# Patient Record
Sex: FEMALE | Race: WHITE | NOT HISPANIC OR LATINO | URBAN - METROPOLITAN AREA
[De-identification: names, ages, dates, MRNs, and addresses within clinical notes are randomized per-mention and may not be internally consistent; named-entity substitution may affect disease eponyms.]

---

## 2019-11-26 ENCOUNTER — NURSING HOME VISIT (OUTPATIENT)
Dept: GERIATRICS | Facility: OTHER | Age: 84
End: 2019-11-26
Payer: MEDICARE

## 2019-11-26 DIAGNOSIS — I49.5 SSS (SICK SINUS SYNDROME) (HCC): Primary | ICD-10-CM

## 2019-11-26 DIAGNOSIS — N18.30 CKD (CHRONIC KIDNEY DISEASE) STAGE 3, GFR 30-59 ML/MIN (HCC): ICD-10-CM

## 2019-11-26 DIAGNOSIS — H91.93 BILATERAL HEARING LOSS, UNSPECIFIED HEARING LOSS TYPE: ICD-10-CM

## 2019-11-26 DIAGNOSIS — I10 ESSENTIAL HYPERTENSION: ICD-10-CM

## 2019-11-26 DIAGNOSIS — D64.9 ANEMIA, UNSPECIFIED TYPE: ICD-10-CM

## 2019-11-26 DIAGNOSIS — Z95.0 S/P PLACEMENT OF CARDIAC PACEMAKER: ICD-10-CM

## 2019-11-26 PROCEDURE — 99310 SBSQ NF CARE HIGH MDM 45: CPT | Performed by: FAMILY MEDICINE

## 2019-11-26 NOTE — PROGRESS NOTES
[facility]   [address] [place] [address], [place], [address]  Progress Note  POS: SNF- 31    Unable to obtain from patient due to: N/A; history obtained from patient along with record review; additional history obtained from daughter, Juan Carlos Tripathi, via telephone  Old medical records reviewed; paper medical records reviewed from [facility]  Chief Complaint/Reason for visit: Transfer of physician service; SSS s/p pacemaker, follow up ELVIS  History of Present Illness: 80year old female seen for transfer of care to my medical service; also seen for follow up of ELVIS, hypertension, SSS s/p pacemaker placement on 11/19/19  Spoke with daughter, Juan Carlos Tripathi ([phone]) via telelphone at 80; she provided additional history; reviewed patient's condition and current plan of care; all questions answered  Patient denies pain at area of pacer site and in general; compliant with LUE restriction in this setting; patient notes some difficulty with ADLs related to this; she is right handed  No chest pain, SOB, LE edema, palpitations, fever or chills  Recent history of ELVIS secondary to fall and rhabdomyolysis  Has CKD3 with baseline creatinine of 1 4  Additional PMH of hypertension on 3 agents (see med list below); SBP trending 110s-160s  Patient is hard of hearing  She is feeling better overall and is anxious to return home  Past Medical History: reviewed and updated   Past Medical History:   Diagnosis Date    Arthritis     CKD (chronic kidney disease) stage 3, GFR 30-59 ml/min ([facility])     GI bleed     Hypertension     Urinary incontinence      Family History: reviewed and updated- noncontributory to HPI  Social History: reviewed and updated  Resident Since: STR  Review of systems: Review of Systems   Constitutional: Negative for activity change, appetite change, fever and unexpected weight change  HENT: Positive for hearing loss  Negative for trouble swallowing  Eyes: Negative for visual disturbance     Respiratory: Negative for cough, chest tightness and shortness of breath  Cardiovascular: Negative for chest pain, palpitations and leg swelling  Gastrointestinal: Negative for abdominal pain and constipation  Genitourinary: Negative for difficulty urinating  Musculoskeletal: Negative for arthralgias and myalgias  Skin: Positive for wound (pacemaker surgical wound)  Negative for rash  Neurological: Negative for dizziness and light-headedness  Psychiatric/Behavioral: Negative for confusion  All other systems reviewed and are negative  Medications: No changes made    PRN: acetaminophen, MoM, dulcolax, fleet enema, maalox  MVI daily  Amlodipine 5mg daily  Aspirin 81mg daily  Triameterene-HCTZ 37 5mg-25mg daily    Allergies: NKDA  Consults reviewed: Other- cardiology  Labs/Diagnostics (reviewed by this provider): Copy in Chart (11/14/19)  CBC: Hb:10 2 Hct:31 5 WBC:3 7 PLT:240  CMP: Na:146 K:5 0 Cl:117 CO:22 BUN:53 Cr:1 47 Glu:84 Ca:8 4   AST:20 ALT:21  Alk-P:108 Tprotein:6 9 Albumin:2 7   Tbili:0 3     Physical Exam    Weight: 102lb (97 6) Temp:97 5F BP:135/81 (113/72- 161/80)  Pulse:73 (69-80) Resp:18 O2 Sat:98% RA  Constitutional: Well-nourished and Normocephalic  Orientation:Person, Place and Day     Physical Exam   Constitutional: No distress  HENT:   Head: Normocephalic and atraumatic  Mouth/Throat: Oropharynx is clear and moist  No oropharyngeal exudate  Eyes: Conjunctivae are normal  Right eye exhibits no discharge  Left eye exhibits no discharge  No scleral icterus  Neck: Neck supple  Cardiovascular: Normal rate and regular rhythm  Left chest wall pacemaker  Incision covered with clear dressing, mild old blood noted; no surrounding erythema or active drainage; scattered ecchymosis around surgical site   Pulmonary/Chest: Effort normal and breath sounds normal    Abdominal: Soft  Bowel sounds are normal    Musculoskeletal: She exhibits no edema  Neurological: She is alert     Hard of hearing Skin: Skin is warm and dry  She is not diaphoretic  There is pallor  Psychiatric: She has a normal mood and affect  Nursing note and vitals reviewed      Assessment/Plan:  80year old female with:    SSS (sick sinus syndrome) ([facility])       S/P placement of cardiac pacemaker  In setting of above  Follow up with cardiology as scheduled on 12/6  Continue LUE precautions for total of 4 weeks postprocedure  Continue PRN acetaminophen- plan to schedule if pain becomes an issue or if patient with confusion, change in behavior  CBC w/diff and BMP ordered for 11/27 for follow up    CKD (chronic kidney disease) stage 3, GFR 30-59 ml/min ([facility])  Baseline creatinine 1 4  With recent ELVIS secondary to fall and traumatic rhabdomyolysis  At risk for ELVIS on multiple BP meds, in perioperative period  Avoid Hypotension  Monitor daily weights  Place on ELVIS pathway  BMP ordered for 11/27    Anemia  CBC w/diff ordered for 11/27    Essential hypertension  Goal <140-150/90  Avoid hypotension in setting of recent ELVIS  BMP ordered for 11/27  Continue amlodipine, triamterene-HCTZ     Bilateral hearing loss       Luis Owens,   11/26/19

## 2019-11-27 PROBLEM — D64.9 ANEMIA: Status: ACTIVE | Noted: 2019-11-27

## 2019-11-27 PROBLEM — H91.93 BILATERAL HEARING LOSS: Status: ACTIVE | Noted: 2019-11-27

## 2019-11-27 PROBLEM — I49.5 SSS (SICK SINUS SYNDROME) (HCC): Status: ACTIVE | Noted: 2019-11-27

## 2019-11-27 PROBLEM — Z95.0 S/P PLACEMENT OF CARDIAC PACEMAKER: Status: ACTIVE | Noted: 2019-11-27

## 2019-11-27 PROBLEM — N18.30 CKD (CHRONIC KIDNEY DISEASE) STAGE 3, GFR 30-59 ML/MIN (HCC): Status: ACTIVE | Noted: 2019-11-27

## 2019-11-27 PROBLEM — I10 ESSENTIAL HYPERTENSION: Status: ACTIVE | Noted: 2019-11-27

## 2019-11-27 NOTE — ASSESSMENT & PLAN NOTE
Goal <140-150/90  Avoid hypotension in setting of recent ELVIS  BMP ordered for 11/27  Continue amlodipine, triamterene-HCTZ

## 2019-11-27 NOTE — ASSESSMENT & PLAN NOTE
Baseline creatinine 1 4  With recent ELVIS secondary to fall and traumatic rhabdomyolysis  At risk for ELVIS on multiple BP meds, in perioperative period  Avoid Hypotension  Monitor daily weights  Place on ELVIS pathway  BMP ordered for 11/27

## 2019-11-27 NOTE — ASSESSMENT & PLAN NOTE
In setting of above  Follow up with cardiology as scheduled on 12/6  Continue LUE precautions for total of 4 weeks postprocedure  Continue PRN acetaminophen- plan to schedule if pain becomes an issue or if patient with confusion, change in behavior  CBC w/diff and BMP ordered for 11/27 for follow up

## 2019-11-29 ENCOUNTER — NURSING HOME VISIT (OUTPATIENT)
Dept: GERIATRICS | Facility: OTHER | Age: 84
End: 2019-11-29
Payer: MEDICARE

## 2019-11-29 DIAGNOSIS — N18.30 CKD (CHRONIC KIDNEY DISEASE) STAGE 3, GFR 30-59 ML/MIN (HCC): ICD-10-CM

## 2019-11-29 DIAGNOSIS — I49.5 SSS (SICK SINUS SYNDROME) (HCC): Primary | ICD-10-CM

## 2019-11-29 DIAGNOSIS — I10 ESSENTIAL HYPERTENSION: ICD-10-CM

## 2019-11-29 DIAGNOSIS — H91.93 BILATERAL HEARING LOSS, UNSPECIFIED HEARING LOSS TYPE: ICD-10-CM

## 2019-11-29 DIAGNOSIS — D64.9 ANEMIA, UNSPECIFIED TYPE: ICD-10-CM

## 2019-11-29 DIAGNOSIS — Z95.0 S/P PLACEMENT OF CARDIAC PACEMAKER: ICD-10-CM

## 2019-11-29 PROCEDURE — 99316 NF DSCHRG MGMT 30 MIN+: CPT | Performed by: NURSE PRACTITIONER

## 2019-11-29 NOTE — PROGRESS NOTES
[facility]  [address]  [phone]  [address] [place] [address]:  [facility]  Mohamud, Marilee N Dayami Al    NAME: Rosalba Jiang  AGE: 80 y o  SEX: female  DATE OF ADMISSION:  11/01/2019    DATE OF DISCHARGE:  12/01/2019 DISCHARGE DISPOSITION:  Home    Reason for admission: Patient was admitted from [facility] for rehabilitation after hospitalization for s/p pacemaker    Admission Diagnoses: SSS s/p pacemaker  Additional Problems:   Past Medical History:   Diagnosis Date    Arthritis     CKD (chronic kidney disease) stage 3, GFR 30-59 ml/min ([facility])     GI bleed     Hypertension     Urinary incontinence      Discharge Diagnoses:  12/01/2019    Course of stay: Patient was admitted to [facility] for rehabilitation following hospitalization  During the resident's stay at [facility], she received skilled nursing care, physical therapy, occupational therapy, social service support, nutritional support, and medical management for an overall improvement improvement in her functional status  She is scheduled to be discharged home on 12/01/2019  Labs and testing performed during stay:  CBC with diff and BMP 1127/2019    Discharge Medications: See discharge medication list which was reviewed  Status at time of discharge: Stable    Today's Visit: 11/29/201911:14 AM    Subjective:  Denies shortness of breath, chest pain, headache, dizziness, lightheadedness, abdominal pain, nausea, vomiting, constipation, or diarrhea  Positive for gait dysfunction  Vitals:  Weight:  98 6 lb   BP:  130/79   TPR:  97 4/74/16    Exam: Physical Exam   Constitutional: She is oriented to person, place, and time  She appears well-developed  Thin elderly female sitting out of bed in chair and appeared comfortable  HENT:   Head: Normocephalic and atraumatic  Mouth/Throat: Oropharynx is clear and moist  No oropharyngeal exudate     Very hard of hearing  Patient does not wear hearing aids  Eyes: Pupils are equal, round, and reactive to light  EOM are normal  Right eye exhibits no discharge  Left eye exhibits no discharge  Neck: Neck supple  No JVD present  No tracheal deviation present  Cardiovascular: Normal rate, regular rhythm and normal heart sounds  Trace edema bilateral ankles and feet  Pulmonary/Chest: Effort normal and breath sounds normal  No respiratory distress  She has no wheezes  She has no rales  Abdominal: Soft  Bowel sounds are normal  She exhibits no distension  There is no tenderness  There is no guarding  Musculoskeletal: She exhibits edema  Neurological: She is alert and oriented to person, place, and time  Skin: Skin is warm and dry  Capillary refill takes less than 2 seconds  Left upper chest pacemaker incision line intact with clear mesh like dressing covering site  Psychiatric: She has a normal mood and affect  Her behavior is normal  Thought content normal    Nursing note and vitals reviewed  Discussion with patient/family and further instructions:  -Fall precautions  -Aspiration precautions  -Bleeding precautions  -Monitor for signs/symptoms of infection  -Medication list was reviewed and signed    Follow-up Recommendations: Please follow-up with your primary care physician within 7-10 days of discharge to review medication changes and current status  Problem List Follow-up Recommendations:  80-year-old female with;    Sick sinus syndrome  -status post placement of cardiac pacemaker    Status post placement of cardiac pacemaker  -in setting of above  -pacer left upper chest with incision line intact  -instructions provided for status post pacemaker insertion precautions-do not elevate left arm above shoulder or lift heavy objects  -follow up with Cardiology on 12/06/19  -continue p r n   Acetaminophen for pain  -denies pain at time of visit  -CBC recviewed on 11/27/19 hemoglobin 9 8 hematocrit 29 8 WBC 3  9 RBC 3 53 platelet count 059 MPV 8 1 MCV 84                           CKD stage 3  -baseline creatinine level 1 4  -with recent ELVIS secondary to fall and traumatic rhabdo Arsenio lysis  -avoid hypotension  -BMP reviewed from 11/27/2019 creatinine level 1 49 GFR 30 BUN 64 potassium 4 8 sodium 141 chloride 112  -follow up with PCP recommend nephrology consult at CKD stage IIIB    Anemia  -follow up with PCP for management    Essential hypertension  -goal <140-150/90  -continue amlodipine and triamterene-HCTZ  -continue aspirin  -follow up with PCP for BP management    Bilateral hearing loss  -has hearing aids but does not wear as reported by patient    I have spent >30 minutes with patient  today in which greater than 50% of this time was spent in counseling/coordination of care regarding Intructions for management, Patient and family education and Importance of tx compliance  **Please note:   This note has been constructed using a voice recognition system**    KRISTY Saini  11/29/201911:14 AM